# Patient Record
(demographics unavailable — no encounter records)

---

## 2021-07-28 NOTE — TELEPHONE ENCOUNTER
Patient never seen in Endocrine here at the [facility] site. Denied. Marielle Sampson RN on 7/28/2021 at 10:24 AM

## 2021-07-28 NOTE — TELEPHONE ENCOUNTER
"Need new rx for     [employer] G6 Sensors  Qty: 3  Refills: Can have up to 5 additional refills  Sig \"CHANGE EVERY 10 DAYS\"    Please call [phone] and speak to one of our Durable Medical Equipment Team members if you have any questions.  "